# Patient Record
(demographics unavailable — no encounter records)

---

## 2025-01-27 NOTE — PHYSICAL EXAM
[Normal Appearance] : normal appearance [Well Groomed] : well groomed [General Appearance - In No Acute Distress] : no acute distress [Edema] : no peripheral edema [Respiration, Rhythm And Depth] : normal respiratory rhythm and effort [Exaggerated Use Of Accessory Muscles For Inspiration] : no accessory muscle use [Abdomen Soft] : soft [Abdomen Tenderness] : non-tender [Costovertebral Angle Tenderness] : no ~M costovertebral angle tenderness [Penis Abnormality] : normal uncircumcised penis [Urinary Bladder Findings] : the bladder was normal on palpation [Epididymis] : the epididymides were normal [Testes Mass (___cm)] : there were no testicular masses [Normal Station and Gait] : the gait and station were normal for the patient's age [] : no rash [No Focal Deficits] : no focal deficits [Oriented To Time, Place, And Person] : oriented to person, place, and time [Affect] : the affect was normal [Mood] : the mood was normal [de-identified] : Right testicular tenerness to palpation, no swelling noted  [Chaperone Present] : A chaperone was present in the examining room during all aspects of the physical examination [FreeTextEntry2] : Goldie GREEN MA

## 2025-01-27 NOTE — REVIEW OF SYSTEMS
[Fever] : no fever [Chills] : no chills [Feeling Tired] : feeling tired [Loss Of Hearing] : hearing loss [see HPI] : see HPI [Dysuria] : no dysuria [Incontinence] : no incontinence [Nocturia] : nocturia [Testicular Pain] : testicular pain [Negative] : Heme/Lymph

## 2025-01-27 NOTE — ASSESSMENT
[FreeTextEntry1] : 66  y.o gentleman with cc of Right Flank pian slightly radiating to Right lower quad abdomen, NO fevers or chills, NO N/V and noticing blood in urine , with Right testicular pain.  He reports first episode of GH 1/4/25 and associated it with lifting of heavy objects .Blood in urine returned 1/11 and 1/12/25 and then again since 1/26/25 Pt dropped off urine 1/14/25  and was treated with Augmentin X 5 days completed yesterday Urine cx was >100 k Staph Epidermis   ml UA, microscopic analysis and cx, Urine Cytology  Fluids and ED precautions reviewed  CT Renal stone hunt ordered  RTO cystoscopy if no cause for gross hematuria elicited from CT and urine tests.

## 2025-01-27 NOTE — HISTORY OF PRESENT ILLNESS
[FreeTextEntry1] : 66  y.o gentleman with cc of Right Flank pian slightly radiating to Right lower quad abdomen, NO fevers or chills, NO N/V and noticing blood in urine , with Right testicular pain.  He reports first episode of GH 1/4/25 and associated it with lifting of heavy objects .Blood in urine returned 1/11 and 1/12/25 and then again since 1/26/25 Pt dropped off urine 1/14/25  and was treated with Augmentin X 5 days completed yesterday Urine cx was >100 k Staph Epidermis .No voiding difficulties voiced. Voids DTF q 3 h and nocturia X 3  Medds reconciled. He is on Finasteride ,Flomax and Oxybutynin ER 10  mg  PMH- BPH,ED,Kidney stones  PSH- Vasectomy  36  years ago  Pt denies any FMH of  cancers FMH- MOM with Chest cavity tumor not in lungs and or breast  Pt had a prior negative work up for Elevated PSA 13 years ago and was told no findings of prostate cancer. No reports on file.  No smoking hx   Fluids : past few weeks has been drinking 20 oz X 3 water  Mild constipation but has daily BM    ml   Renal US 9/23/24  There are multiple punctate echogenic foci seen bilaterally, on the right seen in the lower pole measuring 3.0 mm with slight distal shadow and twinkle artifact. In the left largest collection seen in the lower pole measuring 5.2 mm also with distal shadow and twinkle artifact. There is a simple cyst seen in the lower pole measuring 2.4 cm x 2.0 cm x 1.7 cm with no flow. Both kidneys are normal in size and echogenicity without , hydronephrosis or solid masses visualized Prostate size on March 2024 imaging was 57  cc

## 2025-01-29 NOTE — HISTORY OF PRESENT ILLNESS
[FreeTextEntry1] : Follow up chronic conditions [de-identified] : HLD: has modified diet, lost weight. Currently on statin -BPH: Following w/ urology for LUTS/BPH. Recent CT showed renal stone. Planning for removal with urology  -HTN: BP stable on current medications

## 2025-01-29 NOTE — PHYSICAL EXAM
[No Acute Distress] : no acute distress [Well Nourished] : well nourished [No Respiratory Distress] : no respiratory distress  [No Accessory Muscle Use] : no accessory muscle use [Clear to Auscultation] : lungs were clear to auscultation bilaterally [Normal Rate] : normal rate  [Regular Rhythm] : with a regular rhythm [Normal S1, S2] : normal S1 and S2 [Soft] : abdomen soft [Normal Insight/Judgement] : insight and judgment were intact

## 2025-02-04 NOTE — ASSESSMENT
[FreeTextEntry1] : 65 yo M with kidney stones and BPH  Ureteroscopy: I explained the technique in detail and how it is performed. Though more invasive than SWL, stone free rates greater than 90% have made it more popular among physicians and patients. Very commonly, a ureteral stent is left in place at the conclusion of the procedure, but only if needed. I explained that if a stent is placed, it would need to be removed either cystoscopically under local anesthesia or it may have a string left externally through the urethra for removal in a couple of days after the procedure. Risks of ureteroscopy include, but are not limited to, bleeding, infection, injury to the bladder or ureter, ureteral perforation, ureteral stricture, and other risks involved with general anesthesia. There is also the risk that the procedure needs to be staged into more than one session based on the patient's internal anatomy and the size of the stone(s). Finally, dilation of the ureter and/or ureteral stent placement prior to definitive ureteroscopy may be necessary to achieve ureteral access safely.  Plan for TURP + URS

## 2025-02-04 NOTE — PHYSICAL EXAM
[General Appearance - Well Developed] : well developed [General Appearance - Well Nourished] : well nourished [Normal Appearance] : normal appearance [Well Groomed] : well groomed [General Appearance - In No Acute Distress] : no acute distress [] : no respiratory distress [Respiration, Rhythm And Depth] : normal respiratory rhythm and effort [Exaggerated Use Of Accessory Muscles For Inspiration] : no accessory muscle use [Urinary Bladder Findings] : the bladder was normal on palpation [Normal Station and Gait] : the gait and station were normal for the patient's age [No Focal Deficits] : no focal deficits [Oriented To Time, Place, And Person] : oriented to person, place, and time [Affect] : the affect was normal [Mood] : the mood was normal

## 2025-02-04 NOTE — HISTORY OF PRESENT ILLNESS
[FreeTextEntry1] : : 1958  Referring Provider: GOVIND ROMERO DO   HPI: Mr. JUVENTINO TAY is a 65 year yo M with a PMHx notable for BPH and ED. He has been seeing Dr. Saavedra who has been treating the patient with tamsulosin and finasteride for approximately 12 years. He last had a TRUS about 12 years ago as well, as well as a biopsy of his prostate. He has a history of kidney stones as well, most recently about 5-6 years ago. His last CT scan about 2 years ago mentioned several nonobstructing kidney stones at [facility] urology. He is s/p vasectomy 35 years ago. PSA  (2.04 on finasteride).   #LUTS: Urinary frequency, nighttime frequency x 3; reasonable urinary flow; slight intermittency, daytime frequency about every 3 hours  #ED:  Struggles with good quality erections Has been on tadalafil 10 mg  #Kidney stones - Prior nonobstructing stones, asymptomatic  Anticoagulation: None All: NKDA Social: nonsmoker and nondrinker; Meds: HTN, HLD medications, no DM PMHx: kidney stones, BPH, ED FHx: mother with cancer PSHx: carpal tunnel surgery Imaging: no imaging to review today  3/28: Here today for follow up of BPH And urinary frequency with kidney stones. LL with low volume around 1.4-1.5L. Jorden 273-250. Discussed that he needs to folow up a low salt diet. UCa 391, d/w patient low salt diet. High fluid intake. The goal should be to drink enough to produce 2.5 liters (quarts) of urine daily. Most studies have shown that high fluid volume intake will decrease the risk of stone formation. Research generally indicates that there appears to be little difference between hard and soft water in the formation of kidney stones. Lemon juice added to the water may also aid with increasing urine pH, urine citric acid and reducing stone formation. Given his urinary frequency, he states that he feels his symptoms have since improved about 60-70% with the oxybutynin  + good erections with PDE5i. ULS prostate reviewed with 57g gland, mild intravesical protrusion. PSA 2023 was 1.02 ng/mL d/w patient.   2024: Here today for follow up with BPH And LUTS and kidney stones. LL with small cluster of stones with 2-3 mm stones.  We also discussed his urinary frequency. He states that the oxybutynin has been very helpful, only having nocturia x 1-2, has occasional constipation. WIth regards to erections, the 20mg did not help but endorses taking medication 1-2 hours prior to intercourse, we discussed that onset of action is slower. LL reviewed, poor volume <1L, but improved Jorden at 100 and UCa with in range. Given that volume was so low, his SS indices are elevated. We will be focusing on increasing fluid.   2025: This telephonic visit was provided via audiovisual technology. The patient, JUVENTINO TAY , was located at [facility] at the time of the visit. The provider, NEVAEH JENKINS , was located at [place], [place] at the time of the visit. The patient, JUVENTINO MURILLONELL and Provider participated in the telephonic visit. Verbal consent for telephonic services was given on 2025  4:40PM by the patient, JUVENTINO TAY .The patient-doctor relationship has been established in audio HIPAA compliant communication. The patient's identity has been confirmed. The patient was previously emailed a copy of the telemedicine consent. They have had a chance to review and has now given verbal consent and has requested care to be assessed and treated via telemedicine. They understand there may be limitations in this process, and that they may need further followup care in the office and/or hospital settings.   Verbal consent given on 2025  4:40PM. Discussed today that we will do TURP at time of the ureteroscopy. We discussed r/b/a of TURP with the ureteroscopy. We discussed that likely stents will be tied to string and removed at the same time.  [Urinary Incontinence] : no urinary incontinence [Urinary Retention] : no urinary retention [Urinary Urgency] : no urinary urgency [Urinary Frequency] : no urinary frequency

## 2025-03-05 NOTE — ASSESSMENT
[FreeTextEntry1] : Discussed post TURP symptoms: Expect urgency and frequency and nocturia which showed improved over time as he continues to heal.  Hematuria still present and may last up to a week.  Expect some modest dysuria. Call if trajectory of dysuria/symptoms or dysuria worsens, or fever or pain or worsening hematuria  2x24 hr urines ordered Renal ultrasound at next visit Uroflow and PVR at next visit PSA also at next visit f/u 2 months to review results

## 2025-03-05 NOTE — HISTORY OF PRESENT ILLNESS
[FreeTextEntry1] : Patient is here for follow-up He is status post TURP and bilateral URS February 26, 2020 by Dr. El Stone analysis pending He had cystoscopy stents removal March 3, 2025 with Dr. El He is here for voiding trial  He was able to void Postvoid residual 58 cc

## 2025-05-11 NOTE — HISTORY OF PRESENT ILLNESS
[FreeTextEntry1] : 67 yr old man BPH and kidney stones  He is status post TURP and bilateral URS February 26, 2025 by Dr. El Stone analysis- 100% Calcium oxalate monohydrate.  He had cystoscopy stents removal March 3, 2025 with Dr. El He was able to void on his subsequent visit after Diaz catheter removal  kidney stones Renal US today- 5mm echogenic focus with twinkle artifact in the lower pole of the left kidney. Left parapelvic cyst 1.4 x 1.3 x 1.5cm. Exophytic right mid pole cyst 0.7 x 0.7 x 0.7cm.  24 hr urine 04/2025- low volume 1.95 L, extremely elevated sodium 298 and calcium 423, elevated animal protein UUN 15, UA 1.0, pH 5.9, citrate 707   BPH s/p TURP Nocturia x 1 No incontinence  Uroflow  Voided volume: 225 cc peak flow 18.6 cc/sec  PVR- 9cc

## 2025-05-11 NOTE — ASSESSMENT
[FreeTextEntry1] : Kidney stones - Increase fluid intake - Decrease sodium and animal proteins significantly   BPH - Improved urinary stream after TURP - Nocturia x 1  plan 24 hr urine ordered to be done before next visit Renal ultrasound ordered to be done at or before next visit Follow up in 6 months